# Patient Record
Sex: MALE
[De-identification: names, ages, dates, MRNs, and addresses within clinical notes are randomized per-mention and may not be internally consistent; named-entity substitution may affect disease eponyms.]

---

## 2024-09-11 ENCOUNTER — APPOINTMENT (OUTPATIENT)
Dept: ORTHOPEDIC SURGERY | Facility: CLINIC | Age: 79
End: 2024-09-11
Payer: MEDICARE

## 2024-09-11 VITALS — WEIGHT: 155 LBS | BODY MASS INDEX: 25.83 KG/M2 | RESPIRATION RATE: 16 BRPM | HEIGHT: 65 IN

## 2024-09-11 DIAGNOSIS — Z78.9 OTHER SPECIFIED HEALTH STATUS: ICD-10-CM

## 2024-09-11 DIAGNOSIS — Z87.39 PERSONAL HISTORY OF OTHER DISEASES OF THE MUSCULOSKELETAL SYSTEM AND CONNECTIVE TISSUE: ICD-10-CM

## 2024-09-11 DIAGNOSIS — Z80.9 FAMILY HISTORY OF MALIGNANT NEOPLASM, UNSPECIFIED: ICD-10-CM

## 2024-09-11 DIAGNOSIS — M25.531 PAIN IN RIGHT WRIST: ICD-10-CM

## 2024-09-11 DIAGNOSIS — Z82.61 FAMILY HISTORY OF ARTHRITIS: ICD-10-CM

## 2024-09-11 PROBLEM — Z00.00 ENCOUNTER FOR PREVENTIVE HEALTH EXAMINATION: Status: ACTIVE | Noted: 2024-09-11

## 2024-09-11 PROCEDURE — 99204 OFFICE O/P NEW MOD 45 MIN: CPT

## 2024-09-11 PROCEDURE — 73130 X-RAY EXAM OF HAND: CPT | Mod: 50

## 2024-09-11 PROCEDURE — 73090 X-RAY EXAM OF FOREARM: CPT | Mod: RT

## 2024-09-11 RX ORDER — LEVOTHYROXINE SODIUM 137 UG/1
TABLET ORAL
Refills: 0 | Status: ACTIVE | COMMUNITY

## 2024-09-11 NOTE — CONSULT LETTER
[Dear  ___] : Dear  [unfilled], [Consult Letter:] : I had the pleasure of evaluating your patient, [unfilled]. [Please see my note below.] : Please see my note below. [Consult Closing:] : Thank you very much for allowing me to participate in the care of this patient.  If you have any questions, please do not hesitate to contact me. [Sincerely,] : Sincerely, [FreeTextEntry3] : Nathan Arciniega MD Co-Director [facility]

## 2024-09-11 NOTE — HISTORY OF PRESENT ILLNESS
[Left] : left hand dominant [FreeTextEntry1] : Patient presents with right CMC joint pain and right volar forearm pain and slight swelling.  Patient states that his symptoms started a year ago and he denies any specific injury.  Patient was seen by his primary care physician and he was prescribed pain medication and he takes it when needed.  Patient works out twice a week, including light weightlifting.  Has a history of gout with his last attack within the last year but never his right upper extremity  Reports had right base of thumb and wrist pain that has significantly improved in the last 24 hours

## 2024-09-11 NOTE — ASSESSMENT
[FreeTextEntry1] : Patient with right volar radial wrist pain that came on suddenly and has significantly improved and now has some forearm pain.  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  Could have had a gout attack versus STT joint arthritis or FCR tendinitis.  It is significantly better so I recommend a forearm-based thumb spica splint and observation.  I anticipate they will improve over the next several weeks.  If having any significant symptoms in the next week or 2 he will call me

## 2024-09-11 NOTE — PHYSICAL EXAM
[de-identified] : On exam there is some generalized fullness over the volar radial wrist.  Nontender over the basal joint but much more tender over the STT joint.  I do not palpate a discrete ganglion.  Has bilateral ulnar drift and MCP joint swelling index through little fingers both hands. 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  Small dystrophic calcifications adjacent to bilateral index fingers MCP joint.  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